# Patient Record
Sex: MALE | Race: WHITE | NOT HISPANIC OR LATINO | Employment: UNEMPLOYED | URBAN - METROPOLITAN AREA
[De-identification: names, ages, dates, MRNs, and addresses within clinical notes are randomized per-mention and may not be internally consistent; named-entity substitution may affect disease eponyms.]

---

## 2023-03-07 ENCOUNTER — TRANSCRIBE ORDERS (OUTPATIENT)
Dept: ADMINISTRATIVE | Facility: HOSPITAL | Age: 73
End: 2023-03-07
Payer: OTHER GOVERNMENT

## 2023-03-07 DIAGNOSIS — R06.02 SHORTNESS OF BREATH: Primary | ICD-10-CM

## 2023-04-19 ENCOUNTER — HOSPITAL ENCOUNTER (OUTPATIENT)
Dept: RESPIRATORY THERAPY | Facility: HOSPITAL | Age: 73
Discharge: HOME | End: 2023-04-19
Admitting: INTERNAL MEDICINE
Payer: MEDICARE

## 2023-04-19 VITALS — RESPIRATION RATE: 12 BRPM | HEART RATE: 88 BPM | OXYGEN SATURATION: 91 %

## 2023-04-19 DIAGNOSIS — R06.02 SHORTNESS OF BREATH: ICD-10-CM

## 2023-04-19 PROCEDURE — 94729 DIFFUSING CAPACITY: CPT

## 2023-04-19 PROCEDURE — 63710000001 ALBUTEROL SULFATE HFA 108 (90 BASE) MCG/ACT AEROSOL SOLUTION 6.7 G INHALER: Performed by: INTERNAL MEDICINE

## 2023-04-19 PROCEDURE — 94060 EVALUATION OF WHEEZING: CPT

## 2023-04-19 PROCEDURE — A9270 NON-COVERED ITEM OR SERVICE: HCPCS | Performed by: INTERNAL MEDICINE

## 2023-04-19 PROCEDURE — 94727 GAS DIL/WSHOT DETER LNG VOL: CPT

## 2023-04-19 PROCEDURE — 94664 DEMO&/EVAL PT USE INHALER: CPT

## 2023-04-19 PROCEDURE — 94799 UNLISTED PULMONARY SVC/PX: CPT

## 2023-04-19 RX ORDER — ALBUTEROL SULFATE 90 UG/1
2 AEROSOL, METERED RESPIRATORY (INHALATION) ONCE
Status: COMPLETED | OUTPATIENT
Start: 2023-04-19 | End: 2023-04-19

## 2023-04-19 RX ADMIN — ALBUTEROL SULFATE 2 PUFF: 108 INHALANT RESPIRATORY (INHALATION) at 13:51

## 2024-03-05 ENCOUNTER — TELEPHONE (OUTPATIENT)
Dept: NEUROSURGERY | Facility: CLINIC | Age: 74
End: 2024-03-05
Payer: OTHER GOVERNMENT

## 2024-03-05 ENCOUNTER — ANCILLARY ORDERS (OUTPATIENT)
Dept: OTHER | Facility: HOSPITAL | Age: 74
End: 2024-03-05
Payer: OTHER GOVERNMENT

## 2024-03-05 NOTE — TELEPHONE ENCOUNTER
Outside images were loaded to our system.  Date of images: 10/5/2023, 1/25/2023 & 10/8/2021  Images loaded: MRI Brain x 2, CT neck [facility]  Placed at .

## 2024-03-13 ENCOUNTER — TELEPHONE (OUTPATIENT)
Dept: NEUROSURGERY | Facility: CLINIC | Age: 74
End: 2024-03-13
Payer: OTHER GOVERNMENT

## 2024-03-13 NOTE — TELEPHONE ENCOUNTER
Daughter called to change appointment due to her being the only one able to bring him in. He is coming in next week. I told her I would put this in a note that we know its because of transportation. She stated she understood.

## 2024-03-21 ENCOUNTER — OFFICE VISIT (OUTPATIENT)
Dept: NEUROSURGERY | Facility: CLINIC | Age: 74
End: 2024-03-21
Payer: MEDICARE

## 2024-03-21 VITALS
DIASTOLIC BLOOD PRESSURE: 94 MMHG | HEIGHT: 70 IN | SYSTOLIC BLOOD PRESSURE: 151 MMHG | BODY MASS INDEX: 32.64 KG/M2 | WEIGHT: 228 LBS | HEART RATE: 90 BPM

## 2024-03-21 DIAGNOSIS — C71.6 PILOCYTIC ASTROCYTOMA OF CEREBELLUM: Primary | ICD-10-CM

## 2024-03-21 NOTE — PROGRESS NOTES
Neurosurgical Consultation      Santos Garber is a 73 y.o. male is being seen for consultation today at the request of Tung Peterson DO for a brain mass. Today patient reports dizziness,ataxia, and his left thumb is numb.    Chief Complaint   Patient presents with    Brain Tumor        Previous treatment:    HPI: This is a 73-year-old gentleman who presents to the neurosurgery clinic for surveillance monitoring of a cerebellar lesion.  He had a cerebellar lesion identified in approximately 2008 in response to a trauma workup.  He underwent surgical resection in April 2008.  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  He did receive 30 doses of postoperative radiation.  His daughter notes that he had neurologic decline over the 3 years following his surgical resection.  He did follow with his primary neurosurgeon until 2016 with surveillance imaging and reports that things were stable.  He was transferred to [facility] to see Dr. Parson and had imaging until approximately 2019.  Per the patient and his daughter he does not have profound neurologic change since seeing Dr. Parson.  He has chronic dizziness.  He does have a history of 2 prior seizures.  He is on aspirin and does have COPD and is on chronic steroids.    History reviewed. No pertinent past medical history.     History reviewed. No pertinent surgical history.     Current Outpatient Medications on File Prior to Visit   Medication Sig Dispense Refill    albuterol sulfate  (90 Base) MCG/ACT inhaler INHALE 2 PUFFS BY MOUTH EVERY 4 HOURS AS NEEDED FOR SHORTNESS OF BREATH FOR TRAVEL WHEN NEBULIZER IS NOT AVAILABLE      alendronate (FOSAMAX) 70 MG tablet TAKE ONE TABLET BY MOUTH ONCE A WEEK - TAKE ALENDRONATE WITH A FULL GLASS OF PLAIN WATER ON AN EMPTY STOMACH AT LEAST 30 MINUTES BEFORE ANY FOOD, BEVERAGE, OR OTHER MEDICINES. DO NOT LIE DOWN FOR 30 M      aspirin 81 MG EC tablet Take 1 tablet by mouth Daily.      atorvastatin  (LIPITOR) 20 MG tablet 1 tablet.      Calcium Citrate-Vitamin D (CITRACAL+D) 315-5 MG-MCG per tablet Take 1 tablet by mouth 2 (Two) Times a Day.      cetirizine (zyrTEC) 5 MG tablet Take 1 tablet by mouth Daily As Needed.      Cholecalciferol 100 MCG (4000 UT) tablet Take  by mouth.      clobetasol propionate (TEMOVATE) 0.05 % cream APPLY THIN LAYER TO AFFECTED AREA TWICE A DAY AS NEEDED FOR RASH ON ELBOWS AND BACK -USE FOR UP TO 1 WEEK FOR FLARE      desonide (DESOWEN) 0.05 % cream APPLY SMALL AMOUNT TO AFFECTED AREA DAILY USE ON SCALY SPOTS ON FACE      docusate sodium 100 MG capsule Take 50 mg by mouth.      ferrous sulfate 325 (65 FE) MG tablet Take 1 tablet by mouth Daily.      finasteride (PROSCAR) 5 MG tablet Take 1 tablet by mouth Daily.      Fluticasone-Salmeterol (Wixela Inhub) 250-50 MCG/ACT DISKUS INHALE 1 DOSE BY MOUTH TWICE A DAY      furosemide (LASIX) 40 MG tablet Take 1 tablet by mouth Daily.      ipratropium (ATROVENT) 0.03 % nasal spray USE 2 SPRAYS IN EACH NOSTRIL TWICE A DAY FOR RHINORHEA      ketoconazole (NIZORAL) 2 % cream APPLY SMALL AMOUNT TO AFFECTED AREA DAILY OF FACE/ NECK      levETIRAcetam (KEPPRA) 750 MG tablet Take 1 tablet by mouth 2 (Two) Times a Day.      losartan (COZAAR) 50 MG tablet TAKE ONE TABLET BY MOUTH TWICE A DAY FOR BLOOD PRESSURE NOTE NEW DOSING      miconazole (MICOTIN) 2 % powder APPLY SMALL AMOUNT TO AFFECTED AREA TWICE A DAY TO SOCKS AND SHOES      modafinil (PROVIGIL) 200 MG tablet TAKE ONE TABLET BY MOUTH EVERY MORNING FOR DAYTIME SLEEPINESS      pantoprazole (PROTONIX) 40 MG EC tablet TAKE ONE TABLET BY MOUTH DAILY BEFORE BREAKFAST FOR STOMACH AND ESOPHAGUS      potassium chloride (K-DUR,KLOR-CON) 20 MEQ CR tablet TAKE ONE TABLET BY MOUTH DAILY FOR POTASSIUM SUPPLEMENT      predniSONE (DELTASONE) 1 MG tablet Take 1 tablet by mouth Daily.      predniSONE (DELTASONE) 10 MG tablet Take 1 tablet by mouth Daily.      rOPINIRole (REQUIP) 1 MG tablet TAKE ONE-HALF  TABLET BY MOUTH EVERY MORNING AND TAKE ONE AND ONE-HALF TABLETS EVERY EVENING FOR RESTLESS LEGS      sennosides-docusate (PERICOLACE) 8.6-50 MG per tablet TAKE 4 TABLETS BY MOUTH TWICE A DAY AS NEEDED FOR CONSTIPATION. REPLACES LACTULOSE      tamsulosin (FLOMAX) 0.4 MG capsule 24 hr capsule Take 1 capsule by mouth every night at bedtime.      tiotropium bromide monohydrate (Spiriva Respimat) 2.5 MCG/ACT aerosol solution inhaler Inhale 2 puffs Daily.      triamcinolone (KENALOG) 0.1 % ointment APPLY SMALL AMOUNT TO AFFECTED AREA DAILY AS NEEDED FOR ITCHY SPOTS. DO NOT USE ON FACE OR GENITALS      traZODone (DESYREL) 50 MG tablet TAKE ONE AND ONE-HALF TABLETS BY MOUTH AT BEDTIME FOR DEPRESSION (Patient not taking: Reported on 3/21/2024)       No current facility-administered medications on file prior to visit.        Allergies   Allergen Reactions    Enoxaparin Other (See Comments)    Heparin Other (See Comments)    Risperidone Mental Status Change    Sertraline Other (See Comments)    Valproic Acid Other (See Comments)     Liver levels decrease    Hydrochlorothiazide Rash        Social History     Socioeconomic History    Marital status:    Tobacco Use    Smoking status: Former     Types: Cigarettes    Smokeless tobacco: Never   Substance and Sexual Activity    Alcohol use: Not Currently    Drug use: Never    Sexual activity: Defer          Review of Systems   Constitutional:  Positive for activity change.   HENT:  Positive for tinnitus.    Eyes: Negative.    Respiratory: Negative.     Cardiovascular: Negative.    Gastrointestinal: Negative.    Endocrine: Negative.    Genitourinary: Negative.    Musculoskeletal: Negative.    Skin: Negative.    Neurological:  Positive for dizziness, weakness (feels off balance), light-headedness, memory problem and confusion.   Hematological: Negative.    Psychiatric/Behavioral:  Positive for agitation, sleep disturbance and depressed mood (PTSD).         Physical  "Examination:     Vitals:    03/21/24 1516   BP: 151/94   Pulse: 90   Weight: 103 kg (228 lb)   Height: 177.8 cm (70\")   PainSc: 0-No pain        Physical Exam  Eyes:      Extraocular Movements: Nystagmus present.      Pupils: Pupils are equal, round, and reactive to light.   Neurological:      Cranial Nerves: Dysarthria present.          Neurological Exam  Mental Status  Awake, alert and oriented to person, place and time. Moderate dysarthria present. Language is fluent with no aphasia.    Cranial Nerves  CN II: Visual fields full to confrontation.  CN III, IV, VI: Nystagmus present: Pupils equal round and reactive to light bilaterally.  CN V: Facial sensation is normal.  CN VII: Full and symmetric facial movement.  CN IX, X: Palate elevates symmetrically  CN XI: Shoulder shrug strength is normal.  CN XII: Tongue midline without atrophy or fasciculations.    Motor  Normal muscle bulk throughout. No pronator drift.    Reflexes    Right pathological reflexes: Kimberly's absent.  Left pathological reflexes: Kimberly's absent.    Coordination  Right: Finger-to-nose abnormality:Left: Finger-to-nose abnormality:    Gait   Unable to rise from chair without using arms.  Deferred ambulation.       Result Review  The following data was reviewed by: Jr Jama MD on 03/21/2024:    Data reviewed : Radiologic studies MRI of the brain with and without contrast from October 5, 2023 does show some possible slight increase in size of the left-sided eccentric fourth ventricular residual lesion.  There is some increasing contrast-enhancement.  I do not appreciate significant fourth ventricular collapse or indication of hydrocephalus.  There is encephalomalacia and cerebellar atrophy that appears unchanged.         Assessment/plan:  This is a 73-year-old gentleman with a pilocytic astrocytoma WHO grade 1 resected from his cerebellum in approximately 2008.  He does have significant neurologic decline with ocular nystagmus, " dysmetria and dizziness.  He is nonambulatory at this juncture secondary to dizziness.  He has severe COPD and is on home oxygen.  Surveillance imaging from October shows potentially subtle increase however not profound.  I do not recommend any neurosurgical intervention at this juncture.  He should undergo MRI of the brain in 2 years for surveillance imaging.  He can follow-up with me at that juncture.  If he were to have worsening neurologic symptoms prior to that he can return earlier.  I was asked about weaning off the Keppra and from my perspective it is reasonable to consider weaning off of this medication but I will defer to his primary care provider.    Diagnoses and all orders for this visit:    1. Pilocytic astrocytoma of cerebellum (Primary)  -     MRI Brain With & Without Contrast; Future         Return in about 2 years (around 3/21/2026).            Jr Jama MD

## 2024-03-22 ENCOUNTER — PATIENT ROUNDING (BHMG ONLY) (OUTPATIENT)
Dept: NEUROSURGERY | Facility: CLINIC | Age: 74
End: 2024-03-22
Payer: OTHER GOVERNMENT

## 2024-03-22 NOTE — PROGRESS NOTES
A My-Chart message has been sent to the patient for [place] [facility]

## 2025-02-13 ENCOUNTER — LAB REQUISITION (OUTPATIENT)
Dept: LAB | Facility: HOSPITAL | Age: 75
End: 2025-02-13
Payer: MEDICARE

## 2025-02-13 DIAGNOSIS — M62.3 IMMOBILITY SYNDROME (PARAPLEGIC): ICD-10-CM

## 2025-02-13 DIAGNOSIS — C71.9 MALIGNANT NEOPLASM OF BRAIN, UNSPECIFIED: ICD-10-CM

## 2025-02-13 LAB
ALBUMIN SERPL-MCNC: 3.4 G/DL (ref 3.5–5.2)
ALBUMIN/GLOB SERPL: 1.2 G/DL
ALP SERPL-CCNC: 194 U/L (ref 39–117)
ALT SERPL W P-5'-P-CCNC: 41 U/L (ref 1–41)
ANION GAP SERPL CALCULATED.3IONS-SCNC: 5.4 MMOL/L (ref 5–15)
AST SERPL-CCNC: 22 U/L (ref 1–40)
B PARAPERT DNA SPEC QL NAA+PROBE: NOT DETECTED
B PERT DNA SPEC QL NAA+PROBE: NOT DETECTED
BASOPHILS # BLD AUTO: 0.03 10*3/MM3 (ref 0–0.2)
BASOPHILS NFR BLD AUTO: 0.6 % (ref 0–1.5)
BILIRUB SERPL-MCNC: 0.4 MG/DL (ref 0–1.2)
BUN SERPL-MCNC: 28 MG/DL (ref 8–23)
BUN/CREAT SERPL: 18.3 (ref 7–25)
C PNEUM DNA NPH QL NAA+NON-PROBE: NOT DETECTED
CALCIUM SPEC-SCNC: 9.4 MG/DL (ref 8.6–10.5)
CHLORIDE SERPL-SCNC: 101 MMOL/L (ref 98–107)
CO2 SERPL-SCNC: 34.6 MMOL/L (ref 22–29)
CREAT SERPL-MCNC: 1.53 MG/DL (ref 0.76–1.27)
DEPRECATED RDW RBC AUTO: 50 FL (ref 37–54)
EGFRCR SERPLBLD CKD-EPI 2021: 46.8 ML/MIN/1.73
EOSINOPHIL # BLD AUTO: 0.14 10*3/MM3 (ref 0–0.4)
EOSINOPHIL NFR BLD AUTO: 2.6 % (ref 0.3–6.2)
ERYTHROCYTE [DISTWIDTH] IN BLOOD BY AUTOMATED COUNT: 13.9 % (ref 12.3–15.4)
FLUAV SUBTYP SPEC NAA+PROBE: NOT DETECTED
FLUBV RNA ISLT QL NAA+PROBE: NOT DETECTED
GLOBULIN UR ELPH-MCNC: 2.9 GM/DL
GLUCOSE SERPL-MCNC: 90 MG/DL (ref 65–99)
HADV DNA SPEC NAA+PROBE: NOT DETECTED
HCOV 229E RNA SPEC QL NAA+PROBE: NOT DETECTED
HCOV HKU1 RNA SPEC QL NAA+PROBE: NOT DETECTED
HCOV NL63 RNA SPEC QL NAA+PROBE: NOT DETECTED
HCOV OC43 RNA SPEC QL NAA+PROBE: NOT DETECTED
HCT VFR BLD AUTO: 30.2 % (ref 37.5–51)
HGB BLD-MCNC: 8.7 G/DL (ref 13–17.7)
HMPV RNA NPH QL NAA+NON-PROBE: NOT DETECTED
HPIV1 RNA ISLT QL NAA+PROBE: NOT DETECTED
HPIV2 RNA SPEC QL NAA+PROBE: NOT DETECTED
HPIV3 RNA NPH QL NAA+PROBE: NOT DETECTED
HPIV4 P GENE NPH QL NAA+PROBE: NOT DETECTED
IMM GRANULOCYTES # BLD AUTO: 0.02 10*3/MM3 (ref 0–0.05)
IMM GRANULOCYTES NFR BLD AUTO: 0.4 % (ref 0–0.5)
LYMPHOCYTES # BLD AUTO: 0.7 10*3/MM3 (ref 0.7–3.1)
LYMPHOCYTES NFR BLD AUTO: 13.1 % (ref 19.6–45.3)
M PNEUMO IGG SER IA-ACNC: NOT DETECTED
MCH RBC QN AUTO: 28.2 PG (ref 26.6–33)
MCHC RBC AUTO-ENTMCNC: 28.8 G/DL (ref 31.5–35.7)
MCV RBC AUTO: 97.7 FL (ref 79–97)
MONOCYTES # BLD AUTO: 0.56 10*3/MM3 (ref 0.1–0.9)
MONOCYTES NFR BLD AUTO: 10.4 % (ref 5–12)
NEUTROPHILS NFR BLD AUTO: 3.91 10*3/MM3 (ref 1.7–7)
NEUTROPHILS NFR BLD AUTO: 72.9 % (ref 42.7–76)
NRBC BLD AUTO-RTO: 0 /100 WBC (ref 0–0.2)
PLATELET # BLD AUTO: 150 10*3/MM3 (ref 140–450)
PMV BLD AUTO: 9.3 FL (ref 6–12)
POTASSIUM SERPL-SCNC: 4.8 MMOL/L (ref 3.5–5.2)
PROT SERPL-MCNC: 6.3 G/DL (ref 6–8.5)
RBC # BLD AUTO: 3.09 10*6/MM3 (ref 4.14–5.8)
RHINOVIRUS RNA SPEC NAA+PROBE: NOT DETECTED
RSV RNA NPH QL NAA+NON-PROBE: NOT DETECTED
SARS-COV-2 RNA RESP QL NAA+PROBE: NOT DETECTED
SODIUM SERPL-SCNC: 141 MMOL/L (ref 136–145)
WBC NRBC COR # BLD AUTO: 5.36 10*3/MM3 (ref 3.4–10.8)

## 2025-02-13 PROCEDURE — 80053 COMPREHEN METABOLIC PANEL: CPT

## 2025-02-13 PROCEDURE — 85025 COMPLETE CBC W/AUTO DIFF WBC: CPT

## 2025-02-13 PROCEDURE — 0202U NFCT DS 22 TRGT SARS-COV-2: CPT
